# Patient Record
Sex: FEMALE | Race: OTHER | HISPANIC OR LATINO | URBAN - METROPOLITAN AREA
[De-identification: names, ages, dates, MRNs, and addresses within clinical notes are randomized per-mention and may not be internally consistent; named-entity substitution may affect disease eponyms.]

---

## 2017-10-30 ENCOUNTER — EMERGENCY (EMERGENCY)
Facility: HOSPITAL | Age: 57
LOS: 1 days | Discharge: HOME | End: 2017-10-30
Attending: EMERGENCY MEDICINE
Payer: COMMERCIAL

## 2017-10-30 VITALS
HEIGHT: 57 IN | DIASTOLIC BLOOD PRESSURE: 74 MMHG | SYSTOLIC BLOOD PRESSURE: 169 MMHG | WEIGHT: 110.01 LBS | RESPIRATION RATE: 18 BRPM | TEMPERATURE: 98 F | OXYGEN SATURATION: 98 % | HEART RATE: 78 BPM

## 2017-10-30 PROCEDURE — 73030 X-RAY EXAM OF SHOULDER: CPT

## 2017-10-30 PROCEDURE — 99283 EMERGENCY DEPT VISIT LOW MDM: CPT | Mod: 25

## 2017-10-30 PROCEDURE — 99284 EMERGENCY DEPT VISIT MOD MDM: CPT

## 2017-10-30 PROCEDURE — 73030 X-RAY EXAM OF SHOULDER: CPT | Mod: 26,RT

## 2017-10-30 RX ORDER — IBUPROFEN 200 MG
600 TABLET ORAL ONCE
Qty: 0 | Refills: 0 | Status: COMPLETED | OUTPATIENT
Start: 2017-10-30 | End: 2017-10-30

## 2017-10-30 RX ADMIN — Medication 600 MILLIGRAM(S): at 11:19

## 2017-10-30 RX ADMIN — Medication 600 MILLIGRAM(S): at 10:49

## 2017-10-30 NOTE — ED ADULT NURSE NOTE - CHPI ED SYMPTOMS NEG
no back pain/no tingling/no fever/no difficulty bearing weight/no stiffness/no deformity/no numbness/no bruising/no abrasion

## 2017-10-30 NOTE — ED ADULT NURSE NOTE - CAS EDN DISCHARGE ASSESSMENT
Symptoms improved/Awake/No adverse reaction to first time med in ED/Alert and oriented to person, place and time

## 2017-10-30 NOTE — ED PROVIDER NOTE - MEDICAL DECISION MAKING DETAILS
58 y/o F pt w/ R shoulder injury. Possible rotator cuff injury. Will do X-ray, pain control, refer to ortho.

## 2017-10-30 NOTE — ED PROVIDER NOTE - OBJECTIVE STATEMENT
56 y/o F pt w/ no significant PMHx presents to ED c/o pain to R shoulder s/p injury today. Pt states she was injured by a moving cart in the [employer] today morning which led to her twisting her R arm. Pt relates pain and swelling to her R shoulder. Pt reports that she has not taken any medications today. Pt denies numbness/tingling, focal weakness, or any other complaints. NKDA.

## 2017-11-07 DIAGNOSIS — S49.91XA UNSPECIFIED INJURY OF RIGHT SHOULDER AND UPPER ARM, INITIAL ENCOUNTER: ICD-10-CM
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
## 2019-02-19 NOTE — ED PROVIDER NOTE - MUSCULOSKELETAL NECK EXAM
Oriented - self; Oriented - place; Oriented - time
no deformity, pain or tenderness. no restriction of movement

## 2019-05-01 ENCOUNTER — APPOINTMENT (OUTPATIENT)
Dept: VASCULAR SURGERY | Facility: CLINIC | Age: 59
End: 2019-05-01

## 2021-07-18 ENCOUNTER — TRANSCRIPTION ENCOUNTER (OUTPATIENT)
Age: 61
End: 2021-07-18

## 2021-07-19 ENCOUNTER — OUTPATIENT (OUTPATIENT)
Dept: OUTPATIENT SERVICES | Facility: HOSPITAL | Age: 61
LOS: 1 days | Discharge: HOME | End: 2021-07-19

## 2023-06-22 NOTE — ED PROVIDER NOTE - SKIN, MLM
Skin normal color for race, warm, dry and intact. No evidence of rash. Ketoconazole Pregnancy And Lactation Text: This medication is Pregnancy Category C and it isn't know if it is safe during pregnancy. It is also excreted in breast milk and breast feeding isn't recommended.

## 2023-08-02 NOTE — ED ADULT NURSE NOTE - FALLEN IN THE PAST
yes details… ROM intact/normal gait/strength 5/5 bilateral upper extremities/strength 5/5 bilateral lower extremities

## 2024-04-05 NOTE — ED ADULT TRIAGE NOTE - NSWEIGHTCALCTOOLDRUG_GEN_A_CORE
used
Discontinue Regimen: Accutane 60mg
Render In Strict Bullet Format?: No
Detail Level: Zone
Initiate Treatment: Isotretinoin 80mg

## 2025-02-27 NOTE — ED ADULT TRIAGE NOTE - AS HEIGHT TYPE
Physical Therapy Visit    Visit Type: Daily Treatment Note  Visit: 2  Referring Provider: Yoselin Jamil MD  Medical Diagnosis (from order): R26.89 - Unstable balance  H83.2X9 - Vestibular disequilibrium   Patient alert and oriented X3.    SUBJECTIVE                                                                                                               \"I need to get walking more\"    Pain / Symptoms  - Pain rating (out of 10): Current: 6      OBJECTIVE                                                                                                                                    Treatment      Moist Hot Pack  - Position: hooklying  - Duration: 10 minutes    Results: decreased pain and improved range of motion  Reaction: no adverse reaction to treatment      Therapeutic Exercise  Recumbent bike, level 2, 5 minutes    In parallel bars,  - Forward walking 10' x4, red band  - Backwards walking 10' x4, red band  - Side stepping 10' x4, red band  - Monster walking 10' x4, red band     Hooklying  - bridging x15  - hamstring curls w/ swiss ball 2x10  - marching, red band x10  - bent knee fall out, red band x10  - single bent knee fallout, red band x10    Sidelying  - clams, red band x10  - hip ext, red band x10    - calf stretch on 1/2 foam roll 30 seconds x2  - soleus stretch on 1/2 foam roll 30 second x1     Neuromuscular Re-Education  In parallel bars  - Forward lunges to bosu w/ B shoulder flexion; 2# dowel los x10  - Lateral lunges to bosu w/ trunk rotation; 2# dowel los x10  - Single leg on bosu w/ yellow weighted ball, woodcutters 2x10 R/L     Skilled input: verbal instruction/cues, tactile instruction/cues and as detailed above    Writer verbally educated and received verbal consent for hand placement, positioning of patient, and techniques to be performed today from patient for hand placement and palpation for techniques and therapist position for techniques as described above and how they are pertinent to  the patient's plan of care.  Home Exercise Program  Access Code: [email or web address]  URL: [email or web address]  Date: 02/11/2025  Prepared by: Isabelle Goodman    Exercises  - Supine Bridge  - 1 x daily - 7 x weekly - 2 sets - 10 reps  - Supine March  - 1 x daily - 7 x weekly - 2 sets - 10 reps  - Hooklying Clamshell with Resistance  - 1 x daily - 7 x weekly - 2 sets - 10 reps  - Supine Lower Trunk Rotation  - 1 x daily - 7 x weekly - 2 sets - 10 reps  - Clamshell  - 1 x daily - 7 x weekly - 2 sets - 10 reps  - Standing Hip Flexion with Resistance Loop  - 1 x daily - 7 x weekly - 2 sets - 10 reps  - Hip Abduction with Resistance Loop  - 1 x daily - 7 x weekly - 2 sets - 10 reps  - Hip Extension with Resistance Loop  - 1 x daily - 7 x weekly - 2 sets - 10 reps  - Heel Raises with Counter Support  - 1 x daily - 7 x weekly - 2 sets - 10 reps      ASSESSMENT                                                                                                            Patient complaining of back pain with activities, however improved pain after heat, exercises, and stretching. Patient with improved step length and walking speed at end of session.  Pain/symptoms after session (out of 10): 3  Education:   - Results of above outlined education: Demonstrates understanding and Verbalizes understanding    PLAN                                                                                                                           Suggestions for next session as indicated: Progress per plan of care       Therapy procedure time and total treatment time can be found documented on the Time Entry flowsheet     stated